# Patient Record
Sex: MALE | Race: BLACK OR AFRICAN AMERICAN | URBAN - METROPOLITAN AREA
[De-identification: names, ages, dates, MRNs, and addresses within clinical notes are randomized per-mention and may not be internally consistent; named-entity substitution may affect disease eponyms.]

---

## 2017-06-19 DIAGNOSIS — J44.9 CHRONIC OBSTRUCTIVE PULMONARY DISEASE, UNSPECIFIED COPD TYPE (HCC): Primary | ICD-10-CM

## 2017-06-19 NOTE — PROGRESS NOTES
Verbal Order with read back per Dr. Pippa Quiroz MD  For PFT smart panel. AMB POC PFT complete w/ bronchodilator  AMB POC PFT complete w/o bronchodilator    Dr. Pippa Quiroz MD will co-sign the orders.

## 2017-07-03 ENCOUNTER — CLINICAL SUPPORT (OUTPATIENT)
Dept: PULMONOLOGY | Age: 57
End: 2017-07-03

## 2017-07-03 ENCOUNTER — OFFICE VISIT (OUTPATIENT)
Dept: PULMONOLOGY | Age: 57
End: 2017-07-03

## 2017-07-03 VITALS
TEMPERATURE: 98.6 F | DIASTOLIC BLOOD PRESSURE: 80 MMHG | WEIGHT: 150 LBS | HEIGHT: 73 IN | RESPIRATION RATE: 20 BRPM | SYSTOLIC BLOOD PRESSURE: 110 MMHG | HEART RATE: 88 BPM | OXYGEN SATURATION: 96 % | BODY MASS INDEX: 19.88 KG/M2

## 2017-07-03 DIAGNOSIS — J44.9 CHRONIC OBSTRUCTIVE PULMONARY DISEASE, UNSPECIFIED COPD TYPE (HCC): Primary | ICD-10-CM

## 2017-07-03 DIAGNOSIS — J44.9 CHRONIC OBSTRUCTIVE PULMONARY DISEASE, UNSPECIFIED COPD TYPE (HCC): ICD-10-CM

## 2017-07-03 PROBLEM — F17.200 TOBACCO DEPENDENCE: Status: ACTIVE | Noted: 2017-07-03

## 2017-07-03 RX ORDER — ALBUTEROL SULFATE 90 UG/1
AEROSOL, METERED RESPIRATORY (INHALATION)
COMMUNITY

## 2017-07-03 RX ORDER — CHOLECALCIFEROL TAB 125 MCG (5000 UNIT) 125 MCG
TAB ORAL DAILY
COMMUNITY

## 2017-07-03 RX ORDER — FLUTICASONE FUROATE AND VILANTEROL 100; 25 UG/1; UG/1
1 POWDER RESPIRATORY (INHALATION) DAILY
COMMUNITY

## 2017-07-03 NOTE — PATIENT INSTRUCTIONS
Breo 1 inhalation daily and remember to exhale fully before inhaling and holding her breath for at least 5 seconds before exhaling also remember to wash mouth with water and spit it out after using Breo  Albuterol 2 inhalations every 4 hours as needed if you require albuterol too often to control respiratory symptoms call the office for severe symptoms go to the emergency room  Always call for symptoms such as increasing shortness of breath or a cough productive of discolored mucus

## 2017-07-03 NOTE — LETTER
Request for Examination Exam Date: 2017 Patient's Name:  Olinda Barcenas SS#:  [id number] :  1960 Pregnant: Not applicable Address:  [address] [place] [address] Phone#:  [phone] (home) Ordering Physician: Pallavi Vanegas MD 
 
Technician: Melbourne Babinski, LPN Insurance Information: See Attached Exams Ordered: CXR: PA & LAT Clinical Data/ Brief History: COPD J44.9, Tobacco Dependence F17.200 Preliminary Exam Report: 
 
 
 
 
 
 
 
 
___________________________  __________________ ___________ Radiologist                  Date         Time

## 2017-07-03 NOTE — PROGRESS NOTES
[facility]  Pulmonary, Critical Care, and Sleep Medicine    Dear Delfin Alexander,    Chief complaint:  COPD   HPI:  Ritesh Beckford is 61years old and comes to the office today at your request concerning an evaluation for COPD. The patient relates that he has shortness of breath with exertion but he is able to mow his own lawn even though he may have to rest and is able to walk through a grocery store without shortness of breath. He denies chest pain chronic cough mucus production coughing up blood leg pain or swelling. He also relates  he sleeps without difficulty and awakens well rested. Not on File  Current Outpatient Prescriptions   Medication Sig    cholecalciferol, VITAMIN D3, (VITAMIN D3) 5,000 unit tab tablet Take  by mouth daily.  ERGOCALCIFEROL, VITAMIN D2, by Does Not Apply route.  fluticasone-vilanterol (BREO ELLIPTA) 100-25 mcg/dose inhaler Take 1 Puff by inhalation daily.  albuterol (PROAIR HFA) 90 mcg/actuation inhaler Take  by inhalation. No current facility-administered medications for this visit. Past Medical History:   Diagnosis Date    Chronic lung disease    He denies a history of heart disease hypertension diabetes kidney disease liver disease tuberculosis cancer and reports a remote history of ulcers  No past surgical history on file.     Family history: Lung cancer       Social History: Employed as an x-ray technician  Smoking history approximately 45 years and now smoking only 2-3 cigarettes a day    Review of systems:  He denies syncope focal muscle weakness or numbness trouble hearing trouble seeing trouble swallowing chronic abdominal pain melena or blood in his stools dysuria hematuria rashes arthritis fever chills recent poor appetite or weight loss    Physical Exam:  Visit Vitals    /80 (BP 1 Location: Left arm, BP Patient Position: At rest)    Pulse 88    Temp 98.6 °F (37 °C) (Oral)    Resp 20    Ht 6' 1\" (1.854 m)    Wt 68 kg (150 lb)    SpO2 96%    BMI 19.79 kg/m2     Well-developed well-nourished and thin  HEENT: pupils equal, reactive, sclera, non-icteric   Oropharynx tongue: normal   Neck: Supple   Lymph Nodes: Supra clavicular and cervical nodes, negative   Chest: Equal symmetrical expansion, no dullness, no wheezes, rales or rubs   Heart: Regular, rhythm without delaney or murmur no carotid bruits  Abdomen: soft, non-tender no masses or organomegaly   Extremities: no cyanosis, clubbing, no edema no calf tenderness  Skin: No rash  Musculoskeletal: No acute joint inflammation or muscle wasting  Neurological: alert, oriented, moves all extremities      LABS:  O2 sat room air at rest 96%  Spirometry 7/3/17 severe obstructive lung defect with significant improvement with bronchodilator  Chest x-ray 7/3/17 slight haziness at the border of the right diaphragm but no abnormality seen on lateral view no other abnormalities except findings suggestive of COPD     Impression:   COPD characterized as severe by pulmonary function tests however the patient is significant as functional  Nicotine dependence which will have a significant impact on the prognosis  Consideration for adding LAMA however the patient is functioning well at this time and we will monitor him before making a final decision  Plan:  The patient was given a modest strategy to stop smoking  Continue Breo and as needed albuterol  Follow-up in 6 months with consideration for low-dose CT in the future    Thanks for allowing me to share in this patient's evaluation    Sincerely, microphone off and was nice meeting you see you in 6 months is to take my call if you have any questions or concerns is is is is his oxygen down to 1 because he did not know he he walks 115 is how much    Rebeca Mosley MD , [facility]    CC: Silvia Jewell MD    [address] [facility]  [place], [address] [address],[place]     P: [phone]     F: [phone]